# Patient Record
Sex: FEMALE | Race: WHITE | Employment: FULL TIME | URBAN - METROPOLITAN AREA
[De-identification: names, ages, dates, MRNs, and addresses within clinical notes are randomized per-mention and may not be internally consistent; named-entity substitution may affect disease eponyms.]

---

## 2017-01-31 PROCEDURE — 36415 COLL VENOUS BLD VENIPUNCTURE: CPT | Performed by: INTERNAL MEDICINE

## 2017-01-31 PROCEDURE — 82607 VITAMIN B-12: CPT | Performed by: INTERNAL MEDICINE

## 2017-01-31 PROCEDURE — 82306 VITAMIN D 25 HYDROXY: CPT | Performed by: INTERNAL MEDICINE

## 2017-01-31 PROCEDURE — 80076 HEPATIC FUNCTION PANEL: CPT | Performed by: INTERNAL MEDICINE

## 2017-03-21 PROCEDURE — 83516 IMMUNOASSAY NONANTIBODY: CPT | Performed by: INTERNAL MEDICINE

## 2017-03-21 PROCEDURE — 84432 ASSAY OF THYROGLOBULIN: CPT | Performed by: INTERNAL MEDICINE

## 2017-03-21 PROCEDURE — 80074 ACUTE HEPATITIS PANEL: CPT | Performed by: INTERNAL MEDICINE

## 2017-04-03 ENCOUNTER — OFFICE VISIT (OUTPATIENT)
Dept: SURGERY | Facility: CLINIC | Age: 57
End: 2017-04-03

## 2017-04-03 ENCOUNTER — PATIENT MESSAGE (OUTPATIENT)
Dept: SURGERY | Facility: CLINIC | Age: 57
End: 2017-04-03

## 2017-04-03 ENCOUNTER — APPOINTMENT (OUTPATIENT)
Dept: LAB | Age: 57
End: 2017-04-03
Attending: INTERNAL MEDICINE
Payer: COMMERCIAL

## 2017-04-03 VITALS
BODY MASS INDEX: 26.09 KG/M2 | WEIGHT: 152.81 LBS | HEART RATE: 81 BPM | TEMPERATURE: 98 F | OXYGEN SATURATION: 98 % | SYSTOLIC BLOOD PRESSURE: 120 MMHG | DIASTOLIC BLOOD PRESSURE: 74 MMHG | HEIGHT: 64 IN

## 2017-04-03 DIAGNOSIS — R79.89 ABNORMAL LIVER FUNCTION TESTS: ICD-10-CM

## 2017-04-03 DIAGNOSIS — R79.89 ABNORMAL LIVER FUNCTION TESTS: Primary | ICD-10-CM

## 2017-04-03 PROCEDURE — 82784 ASSAY IGA/IGD/IGG/IGM EACH: CPT

## 2017-04-03 PROCEDURE — 80076 HEPATIC FUNCTION PANEL: CPT

## 2017-04-03 PROCEDURE — 86038 ANTINUCLEAR ANTIBODIES: CPT

## 2017-04-03 PROCEDURE — 85610 PROTHROMBIN TIME: CPT

## 2017-04-03 PROCEDURE — 36415 COLL VENOUS BLD VENIPUNCTURE: CPT

## 2017-04-03 PROCEDURE — 85049 AUTOMATED PLATELET COUNT: CPT

## 2017-04-03 NOTE — PROGRESS NOTES
[facility]  [address] [facility], [address]  [address]      Hepatology Consult    Re hepatosplenomegaly  Derm: no rash  Neuro: A&Ox3, no asterixis  Psych: normal affect/mood    Assessment and Plan:  49year old with abnormal liver tests in setting of weight gain; +AMA    - Since she has lost ~12 lbs since LFT first noted to be elevated, we

## 2017-10-16 ENCOUNTER — LAB ENCOUNTER (OUTPATIENT)
Dept: LAB | Age: 57
End: 2017-10-16
Attending: INTERNAL MEDICINE
Payer: COMMERCIAL

## 2017-10-16 DIAGNOSIS — R79.89 HYPOURICEMIA: Primary | ICD-10-CM

## 2017-10-16 PROCEDURE — 83516 IMMUNOASSAY NONANTIBODY: CPT

## 2017-10-20 PROCEDURE — 82746 ASSAY OF FOLIC ACID SERUM: CPT | Performed by: INTERNAL MEDICINE

## 2017-10-20 PROCEDURE — 82607 VITAMIN B-12: CPT | Performed by: INTERNAL MEDICINE

## 2017-10-24 DIAGNOSIS — R79.89 ABNORMAL LIVER FUNCTION TESTS: Primary | ICD-10-CM

## 2018-04-12 ENCOUNTER — LAB ENCOUNTER (OUTPATIENT)
Dept: LAB | Age: 58
End: 2018-04-12
Attending: INTERNAL MEDICINE
Payer: COMMERCIAL

## 2018-04-12 PROCEDURE — 80076 HEPATIC FUNCTION PANEL: CPT | Performed by: INTERNAL MEDICINE

## 2018-04-16 DIAGNOSIS — R79.89 ABNORMALITY IN OTHER LIVER FUNCTION TEST: Primary | ICD-10-CM

## 2018-12-12 PROBLEM — N81.4 CYSTOCELE WITH UTERINE PROLAPSE: Status: ACTIVE | Noted: 2018-10-09

## 2018-12-12 PROBLEM — H00.021 HORDEOLUM INTERNUM RIGHT UPPER EYELID: Status: ACTIVE | Noted: 2018-11-28

## 2018-12-12 PROBLEM — Z01.01 ENCOUNTER FOR EXAMINATION OF EYES AND VISION WITH ABNORMAL FINDINGS: Status: ACTIVE | Noted: 2018-11-28

## (undated) NOTE — MR AVS SNAPSHOT
[facility]  [address] [facility], [address] [place] [address]                    After Visit Summary   4/3/2017    Wilver Pepe    MRN: [id number]           Visit Information        Provider Department Dept Phone    4/3/ office, you can view your past visit information in [employer] by going to Visits < Visit Summaries. [employer] questions? Call [phone] for help. [employer] is NOT to be used for urgent needs. For medical emergencies, dial 911.